# Patient Record
Sex: MALE | Race: WHITE | NOT HISPANIC OR LATINO | URBAN - METROPOLITAN AREA
[De-identification: names, ages, dates, MRNs, and addresses within clinical notes are randomized per-mention and may not be internally consistent; named-entity substitution may affect disease eponyms.]

---

## 2018-07-07 ENCOUNTER — EMERGENCY (EMERGENCY)
Facility: HOSPITAL | Age: 63
LOS: 1 days | Discharge: HOME | End: 2018-07-07
Attending: PERSONAL EMERGENCY RESPONSE ATTENDANT
Payer: COMMERCIAL

## 2018-07-07 VITALS
HEART RATE: 71 BPM | SYSTOLIC BLOOD PRESSURE: 121 MMHG | DIASTOLIC BLOOD PRESSURE: 71 MMHG | OXYGEN SATURATION: 98 % | RESPIRATION RATE: 16 BRPM | TEMPERATURE: 98 F

## 2018-07-07 PROCEDURE — 99283 EMERGENCY DEPT VISIT LOW MDM: CPT | Mod: 25

## 2018-07-07 PROCEDURE — 90471 IMMUNIZATION ADMIN: CPT

## 2018-07-07 PROCEDURE — 90715 TDAP VACCINE 7 YRS/> IM: CPT

## 2018-07-07 PROCEDURE — 12001 RPR S/N/AX/GEN/TRNK 2.5CM/<: CPT

## 2018-07-07 RX ORDER — TETANUS TOXOID, REDUCED DIPHTHERIA TOXOID AND ACELLULAR PERTUSSIS VACCINE, ADSORBED 5; 2.5; 8; 8; 2.5 [IU]/.5ML; [IU]/.5ML; UG/.5ML; UG/.5ML; UG/.5ML
0.5 SUSPENSION INTRAMUSCULAR ONCE
Qty: 0 | Refills: 0 | Status: COMPLETED | OUTPATIENT
Start: 2018-07-07 | End: 2018-07-07

## 2018-07-07 RX ORDER — IBUPROFEN 200 MG
600 TABLET ORAL ONCE
Qty: 0 | Refills: 0 | Status: COMPLETED | OUTPATIENT
Start: 2018-07-07 | End: 2018-07-07

## 2018-07-07 RX ADMIN — Medication 600 MILLIGRAM(S): at 20:50

## 2018-07-07 RX ADMIN — TETANUS TOXOID, REDUCED DIPHTHERIA TOXOID AND ACELLULAR PERTUSSIS VACCINE, ADSORBED 0.5 MILLILITER(S): 5; 2.5; 8; 8; 2.5 SUSPENSION INTRAMUSCULAR at 21:20

## 2018-07-07 NOTE — ED PROVIDER NOTE - OBJECTIVE STATEMENT
62 yo M no pmh p/w laceration to the palmar surface of the left hand. Injury sustained while cutting a watermelon 30 minutes prior to arrival. Pt is able to move his hand and his fingers. Denies any numbness, tingling, or weakness. Reports the area is bleeding but in minimal pain at this time. Unknown last tetanus

## 2018-07-07 NOTE — ED PROVIDER NOTE - PLAN OF CARE
Take all medications as directed.  For pain take Ibuprofen (Motrin, Advil) 400mg-600mg every 6-8 hours or Acetaminophen (Tylenol) 250mg-650mg every 6-8 hours.  Follow up with your primary physician in 3-4 days. If needed call [phone] to find a primary care physician or call  [phone] to schedule an appointment with the general medicine clinic.   You were given copies of all labs and imaging results from this ER visit, please take them to your appointment.  Return to the ER for worsening symptoms or any other concerns.     Please have sutures taken out in 7-10 days.

## 2018-07-07 NOTE — ED PROVIDER NOTE - ATTENDING CONTRIBUTION TO CARE
Attending MD Shen.  Agree with above.  Pt is a 63 yr old male with no sig pmhx who presents to ED s/p L palmar surface laceration sustained while cutting a watermelon causing a superficial laceration with visible subq tissue but no visible tendon/laceration injury, no active bleeding.  FROM.  NVSC intact distal to site.  Last tetanus shot 3 yrs ago. No other injuries sustained. When flexor/extensor tendons isolated pt has full strength at all joints.  No active bleeding.  No FB visible.  Distal sensation, cap refill intact.  Tetanus shot boosted today.  Pt counseled re: risk of infection with any laceration however wound cleansed/flushed with saline and repaired with superficial wound, clean fishhook.  No ppx abxs warranted at this time.  Stable for discharge and counseled re: need to follow-up with PCP in 7 days for reassessment and suture removal.  Keep site clean/dry.  Pat dry if becomes wet.  May apply abxs ointment for comfort.  Do not apply hydrogen peroxide/alcohol/betadine to wound.  Verbalizes understanding of the plan of care. Attending MD Shen.  Agree with above.  Pt is a 63 yr old male with no sig pmhx who presents to ED s/p L palmar surface laceration sustained while cutting a watermelon causing a superficial laceration with visible subq tissue but no visible tendon/laceration injury, no active bleeding.  FROM.  NVSC intact distal to site.  Last tetanus shot 3 yrs ago. No other injuries sustained. When flexor/extensor tendons isolated pt has full strength at all joints.  No active bleeding.  No FB visible.  Distal sensation, cap refill intact.  Tetanus shot boosted today.  Pt counseled re: risk of infection with any laceration however wound cleansed/flushed with saline and repaired with superficial wound. No ppx abxs warranted at this time.  Stable for discharge and counseled re: need to follow-up with PCP in 7 days for reassessment and suture removal.  Keep site clean/dry.  Pat dry if becomes wet.  May apply abxs ointment for comfort.  Do not apply hydrogen peroxide/alcohol/betadine to wound.  Verbalizes understanding of the plan of care.

## 2018-07-07 NOTE — ED PROVIDER NOTE - CARE PLAN
Principal Discharge DX:	Laceration of palm  Assessment and plan of treatment:	Take all medications as directed.  For pain take Ibuprofen (Motrin, Advil) 400mg-600mg every 6-8 hours or Acetaminophen (Tylenol) 250mg-650mg every 6-8 hours.  Follow up with your primary physician in 3-4 days. If needed call [phone] to find a primary care physician or call  [phone] to schedule an appointment with the general medicine clinic.   You were given copies of all labs and imaging results from this ER visit, please take them to your appointment.  Return to the ER for worsening symptoms or any other concerns.     Please have sutures taken out in 7-10 days.

## 2018-07-07 NOTE — ED PROVIDER NOTE - MEDICAL DECISION MAKING DETAILS
Laceration of palm, will require suture repair and updated tetanus shot.   Removal in 7-10 days. Wound will be irrigated, abx ointment used.   Cristal Coffman, PGY-2 EM

## 2021-06-21 ENCOUNTER — NON-APPOINTMENT (OUTPATIENT)
Age: 66
End: 2021-06-21

## 2021-06-21 ENCOUNTER — APPOINTMENT (OUTPATIENT)
Dept: OPHTHALMOLOGY | Facility: CLINIC | Age: 66
End: 2021-06-21
Payer: MEDICARE

## 2021-06-21 PROBLEM — Z00.00 ENCOUNTER FOR PREVENTIVE HEALTH EXAMINATION: Status: ACTIVE | Noted: 2021-06-21

## 2021-06-21 PROCEDURE — 92004 COMPRE OPH EXAM NEW PT 1/>: CPT

## 2021-06-21 PROCEDURE — 99072 ADDL SUPL MATRL&STAF TM PHE: CPT

## 2024-01-18 ENCOUNTER — APPOINTMENT (OUTPATIENT)
Dept: OPHTHALMOLOGY | Facility: CLINIC | Age: 69
End: 2024-01-18
Payer: MEDICARE

## 2024-01-18 ENCOUNTER — NON-APPOINTMENT (OUTPATIENT)
Age: 69
End: 2024-01-18

## 2024-01-18 PROCEDURE — 92014 COMPRE OPH EXAM EST PT 1/>: CPT

## 2024-01-18 PROCEDURE — 92250 FUNDUS PHOTOGRAPHY W/I&R: CPT

## 2025-02-12 ENCOUNTER — NON-APPOINTMENT (OUTPATIENT)
Age: 70
End: 2025-02-12

## 2025-02-12 ENCOUNTER — APPOINTMENT (OUTPATIENT)
Dept: OPHTHALMOLOGY | Facility: CLINIC | Age: 70
End: 2025-02-12
Payer: MEDICARE

## 2025-02-12 PROCEDURE — 92014 COMPRE OPH EXAM EST PT 1/>: CPT
